# Patient Record
Sex: MALE | Race: OTHER
[De-identification: names, ages, dates, MRNs, and addresses within clinical notes are randomized per-mention and may not be internally consistent; named-entity substitution may affect disease eponyms.]

---

## 2017-01-03 ENCOUNTER — HOSPITAL ENCOUNTER (OUTPATIENT)
Dept: HOSPITAL 53 - M RAD | Age: 37
End: 2017-01-03
Attending: UROLOGY
Payer: COMMERCIAL

## 2017-01-03 DIAGNOSIS — Z90.79: Primary | ICD-10-CM

## 2017-01-03 DIAGNOSIS — K42.9: ICD-10-CM

## 2017-01-03 LAB
ANION GAP SERPL CALC-SCNC: 8 MEQ/L (ref 8–16)
BUN SERPL-MCNC: 15 MG/DL (ref 7–18)
CALCIUM SERPL-MCNC: 9 MG/DL (ref 8.5–10.1)
CHLORIDE SERPL-SCNC: 107 MEQ/L (ref 98–107)
CO2 SERPL-SCNC: 28 MEQ/L (ref 21–32)
CREAT SERPL-MCNC: 1 MG/DL (ref 0.7–1.3)
ERYTHROCYTE [DISTWIDTH] IN BLOOD BY AUTOMATED COUNT: 13.4 % (ref 11.5–14.5)
GFR SERPL CREATININE-BSD FRML MDRD: > 60 ML/MIN/{1.73_M2} (ref 60–?)
GLUCOSE SERPL-MCNC: 80 MG/DL (ref 70–105)
MCH RBC QN AUTO: 28.4 PG (ref 27–33)
MCHC RBC AUTO-ENTMCNC: 33.3 G/DL (ref 32–36.5)
MCV RBC AUTO: 85.2 FL (ref 80–96)
PLATELET # BLD AUTO: 261 K/MM3 (ref 150–450)
POTASSIUM SERPL-SCNC: 4.2 MEQ/L (ref 3.5–5.1)
SODIUM SERPL-SCNC: 143 MEQ/L (ref 136–145)
WBC # BLD AUTO: 5.2 K/MM3 (ref 4–10)

## 2017-01-04 NOTE — REP
Clinical:  Testicular cancer with prior orchiectomy for follow up.

 

Technique:  Axial contrast enhanced images from the lung bases to the pubic

symphysis using 100 ml Isovue 370 intravenous contrast material with precontrast

and delayed images of the abdomen as well as coronal and sagittal re-formations.

 

Comparison:  05/16/2016.

 

Findings:

Lung bases are clear.  Visualized heart and pericardium normal.

 

Liver, spleen, pancreas, gallbladder, bilateral adrenal glands and kidneys are

normal.  The enteric system is without obstruction or acute inflammatory process

and a normal terminal ileum and appendix are identified in the right lower

quadrant.  Scattered sigmoid diverticula noted without acute diverticulitis.

Pelvis demonstrates normal bladder and age appropriate prostate/seminal vesicles.

2 cm fat containing periumbilical hernia identified.  Evidence for prior

orchiectomy. No ascites.  No adenopathy.  No free air.  Evidence for partial

laminectomy at L5.

 

Impression:

1.  No evidence for malignancy or metastatic disease related to testicular

cancer.

2.  2 cm fat containing periumbilical hernia.

3.  No ascites, adenopathy, mass.

4.  Prior partial laminectomy and L5.

 

 

Signed by

David Adame MD 01/04/2017 12:29 A

## 2017-01-14 ENCOUNTER — HOSPITAL ENCOUNTER (OUTPATIENT)
Dept: HOSPITAL 53 - M LAB | Age: 37
End: 2017-01-14
Attending: PHYSICIAN ASSISTANT
Payer: COMMERCIAL

## 2017-01-14 DIAGNOSIS — I10: Primary | ICD-10-CM

## 2017-01-14 LAB
ALBUMIN SERPL BCG-MCNC: 3.7 GM/DL (ref 3.2–5.2)
ALBUMIN/GLOB SERPL: 1 {RATIO} (ref 1–1.93)
ALP SERPL-CCNC: 69 U/L (ref 45–117)
ALT SERPL W P-5'-P-CCNC: 59 U/L (ref 12–78)
ANION GAP SERPL CALC-SCNC: 8 MEQ/L (ref 8–16)
AST SERPL-CCNC: 24 U/L (ref 15–37)
BASOPHILS # BLD AUTO: 0 K/MM3 (ref 0–0.2)
BASOPHILS NFR BLD AUTO: 0.6 % (ref 0–1)
BILIRUB SERPL-MCNC: 0.5 MG/DL (ref 0.2–1)
BUN SERPL-MCNC: 14 MG/DL (ref 7–18)
CALCIUM SERPL-MCNC: 8.6 MG/DL (ref 8.5–10.1)
CHLORIDE SERPL-SCNC: 107 MEQ/L (ref 98–107)
CHOLEST SERPL-MCNC: 183 MG/DL (ref ?–200)
CO2 SERPL-SCNC: 29 MEQ/L (ref 21–32)
CREAT SERPL-MCNC: 1.07 MG/DL (ref 0.7–1.3)
EOSINOPHIL # BLD AUTO: 0.1 K/MM3 (ref 0–0.5)
EOSINOPHIL NFR BLD AUTO: 3.1 % (ref 0–3)
ERYTHROCYTE [DISTWIDTH] IN BLOOD BY AUTOMATED COUNT: 13.5 % (ref 11.5–14.5)
GFR SERPL CREATININE-BSD FRML MDRD: > 60 ML/MIN/{1.73_M2} (ref 60–?)
GLUCOSE SERPL-MCNC: 95 MG/DL (ref 70–105)
LARGE UNSTAINED CELL #: 0.2 K/MM3 (ref 0–0.4)
LARGE UNSTAINED CELL %: 3.8 % (ref 0–4)
LYMPHOCYTES # BLD AUTO: 1.1 K/MM3 (ref 1.5–4.5)
LYMPHOCYTES NFR BLD AUTO: 23 % (ref 24–44)
MCH RBC QN AUTO: 28.9 PG (ref 27–33)
MCHC RBC AUTO-ENTMCNC: 34.1 G/DL (ref 32–36.5)
MCV RBC AUTO: 84.9 FL (ref 80–96)
MONOCYTES # BLD AUTO: 0.3 K/MM3 (ref 0–0.8)
MONOCYTES NFR BLD AUTO: 6.8 % (ref 0–5)
NEUTROPHILS # BLD AUTO: 2.9 K/MM3 (ref 1.8–7.7)
NEUTROPHILS NFR BLD AUTO: 62.8 % (ref 36–66)
PLATELET # BLD AUTO: 253 K/MM3 (ref 150–450)
POTASSIUM SERPL-SCNC: 4.1 MEQ/L (ref 3.5–5.1)
PROT SERPL-MCNC: 7.4 GM/DL (ref 6.4–8.2)
SODIUM SERPL-SCNC: 144 MEQ/L (ref 136–145)
TRIGL SERPL-MCNC: 121 MG/DL (ref ?–150)
WBC # BLD AUTO: 4.6 K/MM3 (ref 4–10)

## 2017-03-30 ENCOUNTER — HOSPITAL ENCOUNTER (OUTPATIENT)
Dept: HOSPITAL 53 - M PT | Age: 37
LOS: 1 days | End: 2017-03-31
Payer: COMMERCIAL

## 2017-03-30 DIAGNOSIS — M51.17: ICD-10-CM

## 2017-03-30 DIAGNOSIS — Z51.89: Primary | ICD-10-CM

## 2017-04-12 ENCOUNTER — HOSPITAL ENCOUNTER (OUTPATIENT)
Dept: HOSPITAL 53 - M PT | Age: 37
LOS: 18 days | End: 2017-04-30
Payer: COMMERCIAL

## 2017-04-12 DIAGNOSIS — Z51.89: Primary | ICD-10-CM

## 2017-04-12 DIAGNOSIS — M51.17: ICD-10-CM

## 2017-06-08 ENCOUNTER — HOSPITAL ENCOUNTER (OUTPATIENT)
Dept: HOSPITAL 53 - M LAB | Age: 37
End: 2017-06-08
Attending: PHYSICIAN ASSISTANT
Payer: COMMERCIAL

## 2017-06-08 DIAGNOSIS — I10: ICD-10-CM

## 2017-06-08 DIAGNOSIS — E78.2: Primary | ICD-10-CM

## 2017-06-08 LAB
ALBUMIN SERPL BCG-MCNC: 3.6 GM/DL (ref 3.2–5.2)
ALBUMIN/GLOB SERPL: 0.88 {RATIO} (ref 1–1.93)
ALP SERPL-CCNC: 73 U/L (ref 45–117)
ALT SERPL W P-5'-P-CCNC: 62 U/L (ref 12–78)
ANION GAP SERPL CALC-SCNC: 7 MEQ/L (ref 8–16)
AST SERPL-CCNC: 26 U/L (ref 15–37)
BASOPHILS # BLD AUTO: 0 K/MM3 (ref 0–0.2)
BASOPHILS NFR BLD AUTO: 0.9 % (ref 0–1)
BILIRUB SERPL-MCNC: 0.4 MG/DL (ref 0.2–1)
BUN SERPL-MCNC: 16 MG/DL (ref 7–18)
CALCIUM SERPL-MCNC: 8.4 MG/DL (ref 8.5–10.1)
CHLORIDE SERPL-SCNC: 106 MEQ/L (ref 98–107)
CHOLEST SERPL-MCNC: 199 MG/DL (ref ?–200)
CO2 SERPL-SCNC: 29 MEQ/L (ref 21–32)
CREAT SERPL-MCNC: 1.11 MG/DL (ref 0.7–1.3)
EOSINOPHIL # BLD AUTO: 0.2 K/MM3 (ref 0–0.5)
EOSINOPHIL NFR BLD AUTO: 3.1 % (ref 0–3)
ERYTHROCYTE [DISTWIDTH] IN BLOOD BY AUTOMATED COUNT: 13.6 % (ref 11.5–14.5)
GFR SERPL CREATININE-BSD FRML MDRD: > 60 ML/MIN/{1.73_M2} (ref 60–?)
GLUCOSE SERPL-MCNC: 90 MG/DL (ref 70–105)
LYMPHOCYTES # BLD AUTO: 1.5 K/MM3 (ref 1.5–4.5)
LYMPHOCYTES NFR BLD AUTO: 25.2 % (ref 24–44)
MCH RBC QN AUTO: 28.9 PG (ref 27–33)
MCHC RBC AUTO-ENTMCNC: 33.2 G/DL (ref 32–36.5)
MCV RBC AUTO: 87 FL (ref 80–96)
MONOCYTES # BLD AUTO: 0.4 K/MM3 (ref 0–0.8)
MONOCYTES NFR BLD AUTO: 6.5 % (ref 0–5)
NEUTROPHILS # BLD AUTO: 3.3 K/MM3 (ref 1.8–7.7)
NEUTROPHILS NFR BLD AUTO: 60.8 % (ref 36–66)
PLATELET # BLD AUTO: 263 K/MM3 (ref 150–450)
POTASSIUM SERPL-SCNC: 3.9 MEQ/L (ref 3.5–5.1)
PROT SERPL-MCNC: 7.7 GM/DL (ref 6.4–8.2)
SODIUM SERPL-SCNC: 142 MEQ/L (ref 136–145)
TRIGL SERPL-MCNC: 110 MG/DL (ref ?–150)
WBC # BLD AUTO: 5.4 K/MM3 (ref 4–10)

## 2017-06-29 ENCOUNTER — HOSPITAL ENCOUNTER (OUTPATIENT)
Dept: HOSPITAL 53 - M WUC | Age: 37
End: 2017-06-29
Attending: UROLOGY
Payer: COMMERCIAL

## 2017-06-29 DIAGNOSIS — Z85.47: Primary | ICD-10-CM

## 2017-06-29 LAB
ALBUMIN SERPL BCG-MCNC: 3.8 GM/DL (ref 3.2–5.2)
ALBUMIN/GLOB SERPL: 1 {RATIO} (ref 1–1.93)
ALP SERPL-CCNC: 64 U/L (ref 45–117)
ALT SERPL W P-5'-P-CCNC: 59 U/L (ref 12–78)
ANION GAP SERPL CALC-SCNC: 7 MEQ/L (ref 8–16)
AST SERPL-CCNC: 26 U/L (ref 15–37)
BILIRUB SERPL-MCNC: 0.5 MG/DL (ref 0.2–1)
BUN SERPL-MCNC: 14 MG/DL (ref 7–18)
CALCIUM SERPL-MCNC: 8.8 MG/DL (ref 8.5–10.1)
CHLORIDE SERPL-SCNC: 106 MEQ/L (ref 98–107)
CO2 SERPL-SCNC: 27 MEQ/L (ref 21–32)
CREAT SERPL-MCNC: 0.98 MG/DL (ref 0.7–1.3)
ERYTHROCYTE [DISTWIDTH] IN BLOOD BY AUTOMATED COUNT: 13.7 % (ref 11.5–14.5)
GFR SERPL CREATININE-BSD FRML MDRD: > 60 ML/MIN/{1.73_M2} (ref 60–?)
GLUCOSE SERPL-MCNC: 91 MG/DL (ref 70–105)
MCH RBC QN AUTO: 29.7 PG (ref 27–33)
MCHC RBC AUTO-ENTMCNC: 34.4 G/DL (ref 32–36.5)
MCV RBC AUTO: 86.2 FL (ref 80–96)
PLATELET # BLD AUTO: 244 K/MM3 (ref 150–450)
POTASSIUM SERPL-SCNC: 4.4 MEQ/L (ref 3.5–5.1)
PROT SERPL-MCNC: 7.6 GM/DL (ref 6.4–8.2)
SODIUM SERPL-SCNC: 140 MEQ/L (ref 136–145)
WBC # BLD AUTO: 4.6 K/MM3 (ref 4–10)

## 2017-07-02 LAB
HCG INTACT+B SERPL-ACNC: < 1 MIU/ML (ref 0–3)
LDH SERPL L TO P-CCNC: 178 IU/L (ref 121–224)
LDH1 CFR SERPL ELPH: 20 % (ref 17–32)
LDH2 CFR SERPL ELPH: 36 % (ref 25–40)
LDH3 CFR SERPL ELPH: 22 % (ref 17–27)
LDH4 CFR SERPL ELPH: 7 % (ref 5–13)
LDH5 CFR SERPL ELPH: 15 % (ref 4–20)

## 2017-07-06 ENCOUNTER — HOSPITAL ENCOUNTER (OUTPATIENT)
Dept: HOSPITAL 53 - M RAD | Age: 37
End: 2017-07-06
Attending: UROLOGY
Payer: COMMERCIAL

## 2017-07-06 DIAGNOSIS — Z90.79: Primary | ICD-10-CM

## 2017-07-06 NOTE — REP
Chest two views

 

HISTORY: Orchiectomy

 

Comparison: 02/01/2016

 

The lungs are clear.  The heart is normal in size.  The pulmonary vasculature is

normal in appearance.  The bony structure is intact.

 

IMPRESSION:  No acute disease.

 

 

Signed by

Christian Cassidy MD 07/06/2017 09:27 A

## 2017-10-05 ENCOUNTER — HOSPITAL ENCOUNTER (OUTPATIENT)
Dept: HOSPITAL 53 - M WUC | Age: 37
End: 2017-10-05
Attending: PHYSICIAN ASSISTANT
Payer: COMMERCIAL

## 2017-10-05 DIAGNOSIS — E78.2: Primary | ICD-10-CM

## 2017-10-05 LAB
ALBUMIN SERPL BCG-MCNC: 3.8 GM/DL (ref 3.2–5.2)
ALBUMIN/GLOB SERPL: 0.97 {RATIO} (ref 1–1.93)
ALP SERPL-CCNC: 67 U/L (ref 45–117)
ALT SERPL W P-5'-P-CCNC: 48 U/L (ref 12–78)
ANION GAP SERPL CALC-SCNC: 5 MEQ/L (ref 8–16)
AST SERPL-CCNC: 21 U/L (ref 15–37)
BILIRUB SERPL-MCNC: 0.5 MG/DL (ref 0.2–1)
BUN SERPL-MCNC: 15 MG/DL (ref 7–18)
CALCIUM SERPL-MCNC: 8.8 MG/DL (ref 8.5–10.1)
CHLORIDE SERPL-SCNC: 107 MEQ/L (ref 98–107)
CHOLEST SERPL-MCNC: 194 MG/DL (ref ?–200)
CO2 SERPL-SCNC: 29 MEQ/L (ref 21–32)
CREAT SERPL-MCNC: 1.06 MG/DL (ref 0.7–1.3)
GFR SERPL CREATININE-BSD FRML MDRD: > 60 ML/MIN/{1.73_M2} (ref 60–?)
GLUCOSE SERPL-MCNC: 89 MG/DL (ref 70–105)
POTASSIUM SERPL-SCNC: 4.1 MEQ/L (ref 3.5–5.1)
PROT SERPL-MCNC: 7.7 GM/DL (ref 6.4–8.2)
SODIUM SERPL-SCNC: 141 MEQ/L (ref 136–145)
TRIGL SERPL-MCNC: 97 MG/DL (ref ?–150)

## 2018-01-10 ENCOUNTER — HOSPITAL ENCOUNTER (OUTPATIENT)
Dept: HOSPITAL 53 - M WUC | Age: 38
End: 2018-01-10
Attending: PHYSICIAN ASSISTANT
Payer: COMMERCIAL

## 2018-01-10 ENCOUNTER — HOSPITAL ENCOUNTER (OUTPATIENT)
Dept: HOSPITAL 53 - M WUC | Age: 38
End: 2018-01-10
Attending: UROLOGY
Payer: COMMERCIAL

## 2018-01-10 DIAGNOSIS — Z85.47: Primary | ICD-10-CM

## 2018-01-10 DIAGNOSIS — E78.2: Primary | ICD-10-CM

## 2018-01-10 LAB
ALBUMIN/GLOBULIN RATIO: 1 (ref 1–1.93)
ALBUMIN: 4 GM/DL (ref 3.2–5.2)
ALKALINE PHOSPHATASE: 63 U/L (ref 45–117)
ALT SERPL W P-5'-P-CCNC: 51 U/L (ref 12–78)
ANION GAP: 7 MEQ/L (ref 8–16)
ANION GAP: 7 MEQ/L (ref 8–16)
AST SERPL-CCNC: 26 U/L (ref 7–37)
BASO #: 0 10^3/UL (ref 0–0.2)
BASO %: 0.8 % (ref 0–1)
BILIRUBIN,TOTAL: 0.5 MG/DL (ref 0.2–1)
BLOOD UREA NITROGEN: 13 MG/DL (ref 7–18)
BLOOD UREA NITROGEN: 14 MG/DL (ref 7–18)
CALCIUM LEVEL: 8.9 MG/DL (ref 8.5–10.1)
CALCIUM LEVEL: 9.1 MG/DL (ref 8.5–10.1)
CARBON DIOXIDE LEVEL: 28 MEQ/L (ref 21–32)
CARBON DIOXIDE LEVEL: 30 MEQ/L (ref 21–32)
CHLORIDE LEVEL: 105 MEQ/L (ref 98–107)
CHLORIDE LEVEL: 106 MEQ/L (ref 98–107)
CHOLEST SERPL-MCNC: 209 MG/DL (ref ?–200)
CHOLESTEROL RISK RATIO: 4.64 (ref ?–5)
CREATININE FOR GFR: 1.06 MG/DL (ref 0.7–1.3)
CREATININE FOR GFR: 1.09 MG/DL (ref 0.7–1.3)
EOS #: 0.1 10^3/UL (ref 0–0.5)
EOSINOPHIL NFR BLD AUTO: 2.8 % (ref 0–3)
GFR SERPL CREATININE-BSD FRML MDRD: > 60 ML/MIN/{1.73_M2} (ref 60–?)
GFR SERPL CREATININE-BSD FRML MDRD: > 60 ML/MIN/{1.73_M2} (ref 60–?)
GLUCOSE, FASTING: 86 MG/DL (ref 70–105)
GLUCOSE, FASTING: 89 MG/DL (ref 70–105)
HDLC SERPL-MCNC: 45 MG/DL (ref 40–?)
HEMATOCRIT: 47.9 % (ref 42–52)
HEMATOCRIT: 47.9 % (ref 42–52)
HEMOGLOBIN: 15.6 G/DL (ref 14–18)
HEMOGLOBIN: 15.8 G/DL (ref 14–18)
IMMATURE GRANULOCYTE #: 0 10^3/UL (ref 0–0)
IMMATURE GRANULOCYTE %: 0.2 % (ref 0–0)
LDL CHOLESTEROL: 138 MG/DL (ref ?–100)
LYMPH #: 1.3 10^3/UL (ref 1.5–4.5)
LYMPH %: 26.4 % (ref 24–44)
MEAN CORPUSCULAR HEMOGLOBIN: 27.9 PG (ref 27–33)
MEAN CORPUSCULAR HEMOGLOBIN: 28.5 PG (ref 27–33)
MEAN CORPUSCULAR HGB CONC: 32.6 G/DL (ref 32–36.5)
MEAN CORPUSCULAR HGB CONC: 33 G/DL (ref 32–36.5)
MEAN CORPUSCULAR VOLUME: 85.5 FL (ref 80–96)
MEAN CORPUSCULAR VOLUME: 86.5 FL (ref 80–96)
MONO #: 0.5 10^3/UL (ref 0–0.8)
MONO %: 10.2 % (ref 0–5)
NEUTROPHILS #: 2.9 10^3/UL (ref 1.8–7.7)
NEUTROPHILS %: 59.6 % (ref 36–66)
NONHDLC SERPL-MCNC: 164 MG/DL
NRBC BLD AUTO-RTO: 0 % (ref 0–0)
NRBC BLD AUTO-RTO: 0 % (ref 0–0)
PLATELET COUNT, AUTOMATED: 280 10^3/UL (ref 150–450)
PLATELET COUNT, AUTOMATED: 303 10^3/UL (ref 150–450)
POTASSIUM SERUM: 4.2 MEQ/L (ref 3.5–5.1)
POTASSIUM SERUM: 4.3 MEQ/L (ref 3.5–5.1)
RED BLOOD COUNT: 5.54 10^6/UL (ref 4.3–6.1)
RED BLOOD COUNT: 5.6 10^6/UL (ref 4.3–6.1)
RED CELL DISTRIBUTION WIDTH: 13.3 % (ref 11.5–14.5)
RED CELL DISTRIBUTION WIDTH: 13.3 % (ref 11.5–14.5)
SODIUM LEVEL: 141 MEQ/L (ref 136–145)
SODIUM LEVEL: 142 MEQ/L (ref 136–145)
TOTAL PROTEIN: 8 GM/DL (ref 6.4–8.2)
TRIGLYCERIDES LEVEL: 130 MG/DL (ref ?–150)
WHITE BLOOD COUNT: 4.9 10^3/UL (ref 4–10)
WHITE BLOOD COUNT: 5 10^3/UL (ref 4–10)

## 2018-01-10 PROCEDURE — 83625 ASSAY OF LDH ENZYMES: CPT

## 2018-01-10 PROCEDURE — 80053 COMPREHEN METABOLIC PANEL: CPT

## 2018-01-12 LAB — ALPHA FETOPROTEIN TUMOR QUANT: 6.8 NG/ML (ref ?–8.1)

## 2018-01-15 LAB
(LD) FRACTION 1: 23 % (ref 17–32)
(LD) FRACTION 3: 23 % (ref 17–27)
(LD) FRACTION 5: 12 % (ref 4–20)
HCG INTACT+B SERPL-ACNC: < 1 MIU/ML (ref 0–3)
LDH SERPL L TO P-CCNC: 147 IU/L (ref 121–224)
LDH2 CFR SERPL ELPH: 33 % (ref 25–40)
LDH4 CFR SERPL ELPH: 9 % (ref 5–13)

## 2018-01-18 ENCOUNTER — HOSPITAL ENCOUNTER (OUTPATIENT)
Dept: HOSPITAL 53 - M RAD | Age: 38
End: 2018-01-18
Attending: UROLOGY
Payer: COMMERCIAL

## 2018-01-18 DIAGNOSIS — K75.81: Primary | ICD-10-CM

## 2018-01-18 DIAGNOSIS — Z85.47: ICD-10-CM

## 2018-03-04 ENCOUNTER — HOSPITAL ENCOUNTER (OUTPATIENT)
Dept: HOSPITAL 53 - M LAB | Age: 38
End: 2018-03-04
Attending: FAMILY MEDICINE
Payer: COMMERCIAL

## 2018-03-04 DIAGNOSIS — E03.9: ICD-10-CM

## 2018-03-04 DIAGNOSIS — R53.83: ICD-10-CM

## 2018-03-04 DIAGNOSIS — I10: Primary | ICD-10-CM

## 2018-03-04 LAB
ALBUMIN/GLOBULIN RATIO: 0.95 (ref 1–1.93)
ALBUMIN: 3.7 GM/DL (ref 3.2–5.2)
ALKALINE PHOSPHATASE: 57 U/L (ref 45–117)
ALT SERPL W P-5'-P-CCNC: 34 U/L (ref 12–78)
ANION GAP: 8 MEQ/L (ref 8–16)
AST SERPL-CCNC: 14 U/L (ref 7–37)
BILIRUBIN,TOTAL: 1 MG/DL (ref 0.2–1)
BLOOD UREA NITROGEN: 12 MG/DL (ref 7–18)
CALCIUM LEVEL: 8.6 MG/DL (ref 8.5–10.1)
CARBON DIOXIDE LEVEL: 28 MEQ/L (ref 21–32)
CHLORIDE LEVEL: 106 MEQ/L (ref 98–107)
CHOLEST SERPL-MCNC: 192 MG/DL (ref ?–200)
CHOLESTEROL RISK RATIO: 4.08 (ref ?–5)
CREATININE FOR GFR: 1.05 MG/DL (ref 0.7–1.3)
EST. AVERAGE GLUCOSE BLD GHB EST-MCNC: 114 MG/DL (ref 60–110)
GFR SERPL CREATININE-BSD FRML MDRD: > 60 ML/MIN/{1.73_M2} (ref 60–?)
GLUCOSE, FASTING: 89 MG/DL (ref 70–100)
HDLC SERPL-MCNC: 47 MG/DL (ref 40–?)
HEMATOCRIT: 45 % (ref 42–52)
HEMOGLOBIN: 15.2 G/DL (ref 14–18)
LDL CHOLESTEROL: 132 MG/DL (ref ?–100)
MEAN CORPUSCULAR HEMOGLOBIN: 28.6 PG (ref 27–33)
MEAN CORPUSCULAR HGB CONC: 33.8 G/DL (ref 32–36.5)
MEAN CORPUSCULAR VOLUME: 84.6 FL (ref 80–96)
NONHDLC SERPL-MCNC: 145 MG/DL
NRBC BLD AUTO-RTO: 0 % (ref 0–0)
PLATELET COUNT, AUTOMATED: 292 10^3/UL (ref 150–450)
POTASSIUM SERUM: 3.9 MEQ/L (ref 3.5–5.1)
PROSTATIC SPECIFIC AG MONITOR: 0.35 NG/ML (ref ?–4)
RED BLOOD COUNT: 5.32 10^6/UL (ref 4.3–6.1)
RED CELL DISTRIBUTION WIDTH: 13.5 % (ref 11.5–14.5)
SODIUM LEVEL: 142 MEQ/L (ref 136–145)
THYROID STIMULATING HORMONE: 1.64 UIU/ML (ref 0.36–3.74)
TOTAL PROTEIN: 7.6 GM/DL (ref 6.4–8.2)
TRIGLYCERIDES LEVEL: 65 MG/DL (ref ?–150)
WHITE BLOOD COUNT: 11.1 10^3/UL (ref 4–10)

## 2018-03-04 PROCEDURE — 71046 X-RAY EXAM CHEST 2 VIEWS: CPT

## 2018-07-09 ENCOUNTER — HOSPITAL ENCOUNTER (OUTPATIENT)
Dept: HOSPITAL 53 - M RAD | Age: 38
End: 2018-07-09
Attending: UROLOGY
Payer: COMMERCIAL

## 2018-07-09 DIAGNOSIS — Z85.47: Primary | ICD-10-CM

## 2018-07-09 PROCEDURE — 71046 X-RAY EXAM CHEST 2 VIEWS: CPT

## 2018-07-19 ENCOUNTER — HOSPITAL ENCOUNTER (OUTPATIENT)
Dept: HOSPITAL 53 - M WUC | Age: 38
End: 2018-07-19
Attending: UROLOGY
Payer: COMMERCIAL

## 2018-07-19 DIAGNOSIS — Z85.47: Primary | ICD-10-CM

## 2018-07-19 LAB — LDH SERPL L TO P-CCNC: 170 U/L (ref 87–241)

## 2018-07-19 PROCEDURE — 83615 LACTATE (LD) (LDH) ENZYME: CPT

## 2018-07-20 LAB
ALPHA FETOPROTEIN TUMOR QUANT: 3.9 NG/ML (ref ?–8.1)
HCG INTACT+B SERPL-ACNC: < 1 MIU/ML (ref 0–3)

## 2018-07-31 ENCOUNTER — HOSPITAL ENCOUNTER (OUTPATIENT)
Dept: HOSPITAL 53 - M RAD | Age: 38
End: 2018-07-31
Attending: UROLOGY
Payer: COMMERCIAL

## 2018-07-31 DIAGNOSIS — Z90.79: ICD-10-CM

## 2018-07-31 DIAGNOSIS — K57.30: ICD-10-CM

## 2018-07-31 DIAGNOSIS — Z85.47: Primary | ICD-10-CM

## 2018-07-31 LAB
ANION GAP: 7 MEQ/L (ref 8–16)
BLOOD UREA NITROGEN: 15 MG/DL (ref 7–18)
CALCIUM LEVEL: 8.6 MG/DL (ref 8.5–10.1)
CARBON DIOXIDE LEVEL: 29 MEQ/L (ref 21–32)
CHLORIDE LEVEL: 108 MEQ/L (ref 98–107)
CREATININE FOR GFR: 1.18 MG/DL (ref 0.7–1.3)
GFR SERPL CREATININE-BSD FRML MDRD: > 60 ML/MIN/{1.73_M2} (ref 60–?)
GLUCOSE, FASTING: 100 MG/DL (ref 70–100)
POTASSIUM SERUM: 3.8 MEQ/L (ref 3.5–5.1)
SODIUM LEVEL: 144 MEQ/L (ref 136–145)

## 2019-01-28 ENCOUNTER — HOSPITAL ENCOUNTER (OUTPATIENT)
Dept: HOSPITAL 53 - M WUC | Age: 39
End: 2019-01-28
Attending: UROLOGY
Payer: COMMERCIAL

## 2019-01-28 DIAGNOSIS — Z85.47: Primary | ICD-10-CM

## 2019-01-28 LAB — LDH SERPL L TO P-CCNC: 171 U/L (ref 87–241)

## 2019-02-07 ENCOUNTER — HOSPITAL ENCOUNTER (OUTPATIENT)
Dept: HOSPITAL 53 - M RAD | Age: 39
End: 2019-02-07
Attending: UROLOGY
Payer: COMMERCIAL

## 2019-02-07 DIAGNOSIS — C62.92: Primary | ICD-10-CM

## 2019-02-07 PROCEDURE — 74177 CT ABD & PELVIS W/CONTRAST: CPT

## 2019-02-08 NOTE — REP
Clinical:  Testicular cancer for restaging.

 

Technique:  Axial contrast enhanced images from the lung bases to the pubic

symphysis using 100 ml Isovue 370 intravenous contrast material with delayed

images of the abdomen as well as coronal and sagittal re-formations.

 

Comparison:  07/31/2018.

 

Findings:

Lung bases are clear.  Visualized heart and pericardium normal.

 

Liver, spleen, pancreas, gallbladder, bilateral adrenal glands and kidneys are

normal.  The enteric system is without obstruction or acute inflammatory process.

Colonic and sigmoid diverticula noted without acute diverticulitis.  Normal

terminal ileum and appendix are identified in the right lower quadrant.  1 cm fat

containing periumbilical hernia identified.

 

Pelvis demonstrates collapsed normal bladder and age appropriate prostate/seminal

vesicles.  Postsurgical changes in the left groin consistent with prior

orchiectomy.  No pelvic fluid or ascites.  No significant intraperitoneal,

retroperitoneal or pelvic/inguinal adenopathy.  No free air.  Abdominal aorta and

vasculature without aneurysm or dissection.  Musculoskeletal structures intact

without focal osseous abnormality.

 

Impression:

1.  Postsurgical changes in the left groin consistent with prior left

orchiectomy.

2.  Diverticulosis without acute diverticulitis.

3.  No further acute abdominopelvic pathology appreciated.

4.  Specifically, no ascites, adenopathy, mass lesion or evidence for metastatic

disease/recurrence.

 

 

Electronically Signed by

David Adame MD 02/08/2019 09:02 A

## 2019-06-04 ENCOUNTER — HOSPITAL ENCOUNTER (OUTPATIENT)
Dept: HOSPITAL 53 - M OPP | Age: 39
Discharge: HOME | End: 2019-06-04
Attending: INTERNAL MEDICINE
Payer: COMMERCIAL

## 2019-06-04 VITALS — SYSTOLIC BLOOD PRESSURE: 153 MMHG | DIASTOLIC BLOOD PRESSURE: 77 MMHG

## 2019-06-04 VITALS — WEIGHT: 315 LBS | HEIGHT: 75 IN | BODY MASS INDEX: 39.17 KG/M2

## 2019-06-04 DIAGNOSIS — K64.8: ICD-10-CM

## 2019-06-04 DIAGNOSIS — K57.30: Primary | ICD-10-CM

## 2019-06-04 NOTE — ROOR
________________________________________________________________________________

Patient Name: Lucius Ham               Procedure Date: 6/4/2019 7:30 AM

MRN: [id number]                          Account Number: [id number]

YOB: 1980               Age: 38

Room: [facility]                            Gender: Male

Note Status: Finalized                 

________________________________________________________________________________

 

Procedure:           Colonoscopy

Indications:         Follow-up of diverticulitis

Providers:           Blu Peng MD

Referring MD:        JAYLA MIR MD

Requesting Provider: 

Medicines:           Monitored Anesthesia Care

Complications:       No immediate complications.

________________________________________________________________________________

Procedure:           Pre-Anesthesia Assessment:

                     - Prior to the procedure, a History and Physical was 

                     performed, and patient medications and allergies were 

                     reviewed. The patient is competent. The risks and 

                     benefits of the procedure and the sedation options and 

                     risks were discussed with the patient. All questions were 

                     answered and informed consent was obtained. Patient 

                     identification and proposed procedure were verified by 

                     the physician, the nurse and the anesthesiologist in the 

                     procedure room. Mental Status Examination: alert and 

                     oriented. Airway Examination: normal oropharyngeal airway 

                     and neck mobility. Respiratory Examination: clear to 

                     auscultation. CV Examination: normal. Prophylactic 

                     Antibiotics: The patient does not require prophylactic 

                     antibiotics. Prior Anticoagulants: The patient has taken 

                     no previous anticoagulant or antiplatelet agents. ASA 

                     Grade Assessment: III - A patient with severe systemic 

                     disease. After reviewing the risks and benefits, the 

                     patient was deemed in satisfactory condition to undergo 

                     the procedure. The anesthesia plan was to use monitored 

                     anesthesia care (MAC). Immediately prior to 

                     administration of medications, the patient was 

                     re-assessed for adequacy to receive sedatives. The heart 

                     rate, respiratory rate, oxygen saturations, blood 

                     pressure, adequacy of pulmonary ventilation, and response 

                     to care were monitored throughout the procedure. The 

                     physical status of the patient was re-assessed after the 

                     procedure.

                     The Colonoscope was introduced through the anus and 

                     advanced to the cecum, identified by appendiceal orifice 

                     and ileocecal valve. The colonoscopy was performed 

                     without difficulty. The patient tolerated the procedure 

                     well. The quality of the bowel preparation was adequate 

                     to identify polyps 6 mm and larger in size and fair. The 

                     terminal ileum, ileocecal valve, appendiceal orifice, and 

                     rectum were photographed. Scope insertion time was 2 

                     minutes. Scope withdrawal time was 9 minutes. The total 

                     duration of the procedure was 11 minutes.

                                                                                

Findings:

     The perianal and digital rectal examinations were normal.

     Multiple small and large-mouthed diverticula were found from sigmoid to 

     ascending colon. There was no evidence of diverticular bleeding.

     Non-bleeding external and internal hemorrhoids were found during 

     retroflexion. The hemorrhoids were medium-sized.

     The exam was otherwise normal throughout the examined colon.

                                                                                

Impression:          - Preparation of the colon was fair.

                     - Moderate diverticulosis from sigmoid to ascending 

                     colon. There was no evidence of diverticular bleeding.

                     - Non-bleeding external and internal hemorrhoids.

                     - No specimens collected.

Recommendation:      - Patient has a contact number available for emergencies. 

                     The signs and symptoms of potential delayed complications 

                     were discussed with the patient. Return to normal 

                     activities tomorrow. Written discharge instructions were 

                     provided to the patient.

                     - High fiber diet.

                     - Continue present medications.

                     - Colace capsule(s) orally 100 mg BID.

                     - Repeat colonoscopy at age 49 for screening purposes.

                     - Telephone GI clinic if symptomatic.

                     - Return to primary care physician.

                                                                                

 

Blu ePng MD

_______________________

Blu Peng MD

6/4/2019 8:05:17 AM

Electronically signed by Blu Peng MD

Number of Addenda: 0

 

Note Initiated On: 6/4/2019 7:30 AM

Estimated Blood Loss:

     Estimated blood loss: none.

## 2020-02-11 ENCOUNTER — HOSPITAL ENCOUNTER (OUTPATIENT)
Dept: HOSPITAL 53 - M RAD | Age: 40
End: 2020-02-11
Attending: UROLOGY
Payer: COMMERCIAL

## 2020-02-11 DIAGNOSIS — Z85.47: Primary | ICD-10-CM

## 2020-02-11 LAB
BUN SERPL-MCNC: 15 MG/DL (ref 7–18)
CALCIUM SERPL-MCNC: 9.5 MG/DL (ref 8.5–10.1)
CHLORIDE SERPL-SCNC: 103 MEQ/L (ref 98–107)
CO2 SERPL-SCNC: 28 MEQ/L (ref 21–32)
CREAT SERPL-MCNC: 1.16 MG/DL (ref 0.7–1.3)
GFR SERPL CREATININE-BSD FRML MDRD: > 60 ML/MIN/{1.73_M2} (ref 60–?)
GLUCOSE SERPL-MCNC: 99 MG/DL (ref 70–100)
LDH SERPL L TO P-CCNC: 165 U/L (ref 87–241)
POTASSIUM SERPL-SCNC: 3.7 MEQ/L (ref 3.5–5.1)
SODIUM SERPL-SCNC: 138 MEQ/L (ref 136–145)

## 2020-02-11 PROCEDURE — 80048 BASIC METABOLIC PNL TOTAL CA: CPT

## 2020-02-11 PROCEDURE — 74177 CT ABD & PELVIS W/CONTRAST: CPT

## 2020-02-11 PROCEDURE — 36415 COLL VENOUS BLD VENIPUNCTURE: CPT

## 2020-02-11 PROCEDURE — 83615 LACTATE (LD) (LDH) ENZYME: CPT

## 2020-02-11 PROCEDURE — 82105 ALPHA-FETOPROTEIN SERUM: CPT

## 2020-02-11 PROCEDURE — 84702 CHORIONIC GONADOTROPIN TEST: CPT

## 2020-02-11 NOTE — REP
Clinical:  History of testicular carcinoma.

 

Technique:  Axial contrast enhanced images from the thoracic inlet to the upper

abdomen using 100 ml Isovue 370 intravenous contrast material with coronal and

sagittal re-formations as well as delayed images of the abdomen.

 

Comparison:  02/07/2019, 01/18/2018.

 

Findings:

Lung bases demonstrate minimal posterior basilar dependent changes.

 

Fatty infiltration to the liver noted without focal hepatic lesion.  Spleen,

pancreas, gallbladder, bilateral adrenal glands and kidneys are normal.  The

enteric system is without obstruction or acute inflammatory process.  Normal

terminal ileum and appendix are identified in the right lower quadrant.

Scattered sigmoid diverticula noted without acute diverticulitis.  Pelvis

demonstrates partially collapsed normal bladder and age appropriate

prostate/seminal vesicles.  Postsurgical scarring at the left groin noted.  No

ascites.  No free air.  No adenopathy.  Abdominal aorta and vasculature appear

normal.  Musculoskeletal structures are intact without focal abnormality.

 

Impression:

1.  Hepatic steatosis.

2.  No acute abdominopelvic pathology appreciated.  No evidence for recurrence or

metastatic disease.

 

 

Electronically Signed by

David Adame MD 02/11/2020 10:58 A

## 2020-08-18 ENCOUNTER — HOSPITAL ENCOUNTER (OUTPATIENT)
Dept: HOSPITAL 53 - M WUC | Age: 40
End: 2020-08-18
Attending: UROLOGY
Payer: COMMERCIAL

## 2020-08-18 DIAGNOSIS — Z85.47: Primary | ICD-10-CM

## 2020-10-12 LAB — LDH SERPL L TO P-CCNC: 187 U/L (ref 87–241)

## 2021-02-17 ENCOUNTER — HOSPITAL ENCOUNTER (OUTPATIENT)
Dept: HOSPITAL 53 - M WUC | Age: 41
End: 2021-02-17
Attending: UROLOGY
Payer: COMMERCIAL

## 2021-02-17 DIAGNOSIS — Z85.47: Primary | ICD-10-CM

## 2021-02-17 LAB
B-HCG SERPL-ACNC: < 1 MIU/ML
LDH SERPL L TO P-CCNC: 163 U/L (ref 87–241)

## 2021-04-16 ENCOUNTER — HOSPITAL ENCOUNTER (OUTPATIENT)
Dept: HOSPITAL 53 - M LAB | Age: 41
End: 2021-04-16
Attending: FAMILY MEDICINE
Payer: COMMERCIAL

## 2021-04-16 DIAGNOSIS — I10: Primary | ICD-10-CM

## 2021-04-16 LAB
ALBUMIN SERPL BCG-MCNC: 3.9 GM/DL (ref 3.2–5.2)
ALT SERPL W P-5'-P-CCNC: 48 U/L (ref 12–78)
BILIRUB SERPL-MCNC: 0.7 MG/DL (ref 0.2–1)
BUN SERPL-MCNC: 15 MG/DL (ref 7–18)
CALCIUM SERPL-MCNC: 9.4 MG/DL (ref 8.5–10.1)
CHLORIDE SERPL-SCNC: 105 MEQ/L (ref 98–107)
CHOLEST SERPL-MCNC: 208 MG/DL (ref ?–200)
CHOLEST/HDLC SERPL: 4.52 {RATIO} (ref ?–5)
CO2 SERPL-SCNC: 30 MEQ/L (ref 21–32)
CREAT SERPL-MCNC: 1.07 MG/DL (ref 0.7–1.3)
EST. AVERAGE GLUCOSE BLD GHB EST-MCNC: 111 MG/DL (ref 60–110)
GFR SERPL CREATININE-BSD FRML MDRD: > 60 ML/MIN/{1.73_M2} (ref 60–?)
GLUCOSE SERPL-MCNC: 93 MG/DL (ref 70–100)
HCT VFR BLD AUTO: 48.1 % (ref 42–52)
HDLC SERPL-MCNC: 46 MG/DL (ref 40–?)
HGB BLD-MCNC: 15.7 G/DL (ref 13.5–17.5)
LDLC SERPL CALC-MCNC: 144 MG/DL (ref ?–100)
MCH RBC QN AUTO: 28.2 PG (ref 27–33)
MCHC RBC AUTO-ENTMCNC: 32.6 G/DL (ref 32–36.5)
MCV RBC AUTO: 86.4 FL (ref 80–96)
NONHDLC SERPL-MCNC: 162 MG/DL
PLATELET # BLD AUTO: 300 10^3/UL (ref 150–450)
POTASSIUM SERPL-SCNC: 3.5 MEQ/L (ref 3.5–5.1)
PROT SERPL-MCNC: 7.9 GM/DL (ref 6.4–8.2)
PSA SERPL-MCNC: 0.51 NG/ML (ref ?–4)
RBC # BLD AUTO: 5.57 10^6/UL (ref 4.3–6.1)
SODIUM SERPL-SCNC: 140 MEQ/L (ref 136–145)
TESTOST SERPL-MCNC: 532 NG/DL (ref 241–827)
TRIGL SERPL-MCNC: 92 MG/DL (ref ?–150)
TSH SERPL DL<=0.005 MIU/L-ACNC: 2 UIU/ML (ref 0.36–3.74)
WBC # BLD AUTO: 4.9 10^3/UL (ref 4–10)

## 2021-04-16 NOTE — REP
INDICATION:

[facility] LABS AND EKG FIRST.



COMPARISON:

Comparison chest x-ray July 9, 2018.



TECHNIQUE:

Two views..



FINDINGS:

The lungs are well inflated and free of infiltrate.  The pleural angles are sharp.

The heart size is normal.  Pulmonary vasculature is not increased.  No significant

bony abnormality is seen.



IMPRESSION:

Negative chest x-ray.





<Electronically signed by Danny Amador > 04/16/21 [id number]

## 2021-04-17 NOTE — ECGEPIP
[facility]

                                       

                                       Test Date:    2021

Pat Name:     VISHAL RAMIREZ               Department:   

Patient ID:   [id number]                 Room:         -

Gender:       Male                     Technician:   ara

:          1980               Requested By: Bill Quigley

Order Number: [id number]     Reading MD:   Andrew Walls

                                 Measurements

Intervals                              Axis          

Rate:         83                       P:            26

NE:           160                      QRS:          23

QRSD:         88                       T:            19

QT:           384                                    

QTc:          451                                    

                           Interpretive Statements

*** Poor data quality, interpretation may be adversely affected

Normal sinus rhythm

RSR' IN V1 OR V2, PROBABLY NORMAL VARIANT

No remarkable changes. Compared to prior tracings (2) in the system

Electronically Signed on 2021 21:04:27 EDT by Andrew Walls

## 2021-08-31 ENCOUNTER — HOSPITAL ENCOUNTER (OUTPATIENT)
Dept: HOSPITAL 53 - M WUC | Age: 41
End: 2021-08-31
Attending: UROLOGY
Payer: COMMERCIAL

## 2021-08-31 DIAGNOSIS — Z85.47: Primary | ICD-10-CM

## 2021-08-31 LAB — LDH SERPL L TO P-CCNC: 153 U/L (ref 87–241)

## 2022-04-28 ENCOUNTER — HOSPITAL ENCOUNTER (OUTPATIENT)
Dept: HOSPITAL 53 - M RAD | Age: 42
End: 2022-04-28
Attending: FAMILY MEDICINE
Payer: COMMERCIAL

## 2022-04-28 DIAGNOSIS — I10: Primary | ICD-10-CM

## 2022-04-28 LAB
25(OH)D3 SERPL-MCNC: 19.6 NG/ML (ref 30–100)
ALBUMIN SERPL BCG-MCNC: 3.8 GM/DL (ref 3.2–5.2)
ALT SERPL W P-5'-P-CCNC: 56 U/L (ref 12–78)
BILIRUB SERPL-MCNC: 0.5 MG/DL (ref 0.2–1)
BUN SERPL-MCNC: 16 MG/DL (ref 7–18)
CALCIUM SERPL-MCNC: 9.7 MG/DL (ref 8.5–10.1)
CHLORIDE SERPL-SCNC: 106 MEQ/L (ref 98–107)
CHOLEST SERPL-MCNC: 173 MG/DL (ref ?–200)
CHOLEST/HDLC SERPL: 3.68 {RATIO} (ref ?–5)
CO2 SERPL-SCNC: 30 MEQ/L (ref 21–32)
CREAT SERPL-MCNC: 1.03 MG/DL (ref 0.7–1.3)
EST. AVERAGE GLUCOSE BLD GHB EST-MCNC: 114 MG/DL (ref 60–110)
GFR SERPL CREATININE-BSD FRML MDRD: > 60 ML/MIN/{1.73_M2} (ref 60–?)
GLUCOSE SERPL-MCNC: 100 MG/DL (ref 70–100)
HCT VFR BLD AUTO: 48.4 % (ref 42–52)
HDLC SERPL-MCNC: 47 MG/DL (ref 40–?)
HGB BLD-MCNC: 16.3 G/DL (ref 13.5–17.5)
LDLC SERPL CALC-MCNC: 102 MG/DL (ref ?–100)
MCH RBC QN AUTO: 28.8 PG (ref 27–33)
MCHC RBC AUTO-ENTMCNC: 33.7 G/DL (ref 32–36.5)
MCV RBC AUTO: 85.5 FL (ref 80–96)
NONHDLC SERPL-MCNC: 126 MG/DL
PLATELET # BLD AUTO: 279 10^3/UL (ref 150–450)
POTASSIUM SERPL-SCNC: 3.5 MEQ/L (ref 3.5–5.1)
PROT SERPL-MCNC: 7.6 GM/DL (ref 6.4–8.2)
PSA SERPL-MCNC: 1.44 NG/ML (ref ?–4)
RBC # BLD AUTO: 5.66 10^6/UL (ref 4.3–6.1)
SODIUM SERPL-SCNC: 141 MEQ/L (ref 136–145)
TESTOST SERPL-MCNC: 572 NG/DL (ref 241–827)
TRIGL SERPL-MCNC: 122 MG/DL (ref ?–150)
TSH SERPL DL<=0.005 MIU/L-ACNC: 1.28 UIU/ML (ref 0.36–3.74)
WBC # BLD AUTO: 6.4 10^3/UL (ref 4–10)

## 2022-09-07 ENCOUNTER — HOSPITAL ENCOUNTER (OUTPATIENT)
Dept: HOSPITAL 53 - M LAB | Age: 42
End: 2022-09-07
Attending: UROLOGY
Payer: COMMERCIAL

## 2022-09-07 DIAGNOSIS — Z85.47: Primary | ICD-10-CM

## 2022-09-07 LAB — LDH SERPL L TO P-CCNC: 164 U/L (ref 87–241)

## 2022-11-01 ENCOUNTER — HOSPITAL ENCOUNTER (OUTPATIENT)
Dept: HOSPITAL 53 - M LAB | Age: 42
End: 2022-11-01
Attending: FAMILY MEDICINE
Payer: COMMERCIAL

## 2022-11-01 DIAGNOSIS — I10: Primary | ICD-10-CM

## 2022-11-01 LAB
ALBUMIN SERPL BCG-MCNC: 3.9 GM/DL (ref 3.2–5.2)
ALT SERPL W P-5'-P-CCNC: 61 U/L (ref 12–78)
BILIRUB SERPL-MCNC: 0.5 MG/DL (ref 0.2–1)
BUN SERPL-MCNC: 19 MG/DL (ref 7–18)
CALCIUM SERPL-MCNC: 9.9 MG/DL (ref 8.5–10.1)
CHLORIDE SERPL-SCNC: 104 MEQ/L (ref 98–107)
CHOLEST SERPL-MCNC: 215 MG/DL (ref ?–200)
CHOLEST/HDLC SERPL: 5.12 {RATIO} (ref ?–5)
CO2 SERPL-SCNC: 29 MEQ/L (ref 21–32)
CREAT SERPL-MCNC: 1.08 MG/DL (ref 0.7–1.3)
EST. AVERAGE GLUCOSE BLD GHB EST-MCNC: 120 MG/DL (ref 60–110)
GFR SERPL CREATININE-BSD FRML MDRD: > 60 ML/MIN/{1.73_M2} (ref 60–?)
GLUCOSE SERPL-MCNC: 109 MG/DL (ref 70–100)
HCT VFR BLD AUTO: 46.7 % (ref 42–52)
HDLC SERPL-MCNC: 42 MG/DL (ref 40–?)
HGB BLD-MCNC: 15.9 G/DL (ref 13.5–17.5)
LDLC SERPL CALC-MCNC: 152 MG/DL (ref ?–100)
MCH RBC QN AUTO: 28.7 PG (ref 27–33)
MCHC RBC AUTO-ENTMCNC: 34 G/DL (ref 32–36.5)
MCV RBC AUTO: 84.3 FL (ref 80–96)
NONHDLC SERPL-MCNC: 173 MG/DL
PLATELET # BLD AUTO: 300 10^3/UL (ref 150–450)
POTASSIUM SERPL-SCNC: 3.2 MEQ/L (ref 3.5–5.1)
PROT SERPL-MCNC: 7.9 GM/DL (ref 6.4–8.2)
PSA SERPL-MCNC: 0.68 NG/ML (ref ?–4)
RBC # BLD AUTO: 5.54 10^6/UL (ref 4.3–6.1)
SODIUM SERPL-SCNC: 138 MEQ/L (ref 136–145)
TESTOST SERPL-MCNC: 483 NG/DL (ref 241–827)
TRIGL SERPL-MCNC: 104 MG/DL (ref ?–150)
TSH SERPL DL<=0.005 MIU/L-ACNC: 2.06 UIU/ML (ref 0.36–3.74)
WBC # BLD AUTO: 4.9 10^3/UL (ref 4–10)

## 2023-03-12 ENCOUNTER — HOSPITAL ENCOUNTER (OUTPATIENT)
Dept: HOSPITAL 53 - M LAB | Age: 43
End: 2023-03-12
Attending: UROLOGY
Payer: COMMERCIAL

## 2023-03-12 DIAGNOSIS — Z85.47: Primary | ICD-10-CM

## 2023-03-12 LAB — LDH SERPL L TO P-CCNC: 182 U/L (ref 120–246)

## 2023-04-13 ENCOUNTER — HOSPITAL ENCOUNTER (OUTPATIENT)
Dept: HOSPITAL 53 - M WUC | Age: 43
End: 2023-04-13
Attending: FAMILY MEDICINE
Payer: COMMERCIAL

## 2023-04-13 DIAGNOSIS — I10: Primary | ICD-10-CM

## 2023-04-13 DIAGNOSIS — E03.9: ICD-10-CM

## 2023-04-13 DIAGNOSIS — R53.83: ICD-10-CM

## 2023-04-13 LAB
25(OH)D3 SERPL-MCNC: 23.1 NG/ML (ref 20–100)
ALBUMIN SERPL BCG-MCNC: 3.9 G/DL (ref 3.2–5.2)
ALP SERPL-CCNC: 56 U/L (ref 46–116)
ALT SERPL W P-5'-P-CCNC: 47 U/L (ref 7–40)
AST SERPL-CCNC: 31 U/L (ref ?–34)
BILIRUB SERPL-MCNC: 0.6 MG/DL (ref 0.3–1.2)
BUN SERPL-MCNC: 17 MG/DL (ref 9–23)
CALCIUM SERPL-MCNC: 9.5 MG/DL (ref 8.5–10.1)
CHLORIDE SERPL-SCNC: 102 MMOL/L (ref 98–107)
CHOLEST SERPL-MCNC: 157 MG/DL (ref ?–200)
CHOLEST/HDLC SERPL: 3.65 {RATIO} (ref ?–5)
CO2 SERPL-SCNC: 30 MMOL/L (ref 20–31)
CREAT SERPL-MCNC: 1.06 MG/DL (ref 0.7–1.3)
EST. AVERAGE GLUCOSE BLD GHB EST-MCNC: 117 MG/DL (ref 60–110)
GFR SERPL CREATININE-BSD FRML MDRD: > 60 ML/MIN/{1.73_M2} (ref 60–?)
GLUCOSE SERPL-MCNC: 103 MG/DL (ref 60–100)
HCT VFR BLD AUTO: 46.4 % (ref 42–52)
HDLC SERPL-MCNC: 42.9 MG/DL (ref 40–?)
HGB BLD-MCNC: 15.5 G/DL (ref 13.5–17.5)
LDLC SERPL CALC-MCNC: 90.9 MG/DL (ref ?–100)
MCH RBC QN AUTO: 28.7 PG (ref 27–33)
MCHC RBC AUTO-ENTMCNC: 33.4 G/DL (ref 32–36.5)
MCV RBC AUTO: 85.9 FL (ref 80–96)
NONHDLC SERPL-MCNC: 114.1 MG/DL
PLATELET # BLD AUTO: 291 10^3/UL (ref 150–450)
POTASSIUM SERPL-SCNC: 3.3 MMOL/L (ref 3.5–5.1)
PROT SERPL-MCNC: 7.3 G/DL (ref 5.7–8.2)
PSA SERPL-MCNC: 0.53 NG/ML (ref ?–4)
RBC # BLD AUTO: 5.4 10^6/UL (ref 4.3–6.1)
SODIUM SERPL-SCNC: 140 MMOL/L (ref 136–145)
TESTOST SERPL-MCNC: 367 NG/DL (ref 241–827)
TRIGL SERPL-MCNC: 116 MG/DL (ref ?–150)
TSH SERPL DL<=0.005 MIU/L-ACNC: 1.19 UIU/ML (ref 0.55–4.78)
WBC # BLD AUTO: 5.6 10^3/UL (ref 4–10)

## 2023-12-07 ENCOUNTER — HOSPITAL ENCOUNTER (OUTPATIENT)
Dept: HOSPITAL 53 - M LABWUC | Age: 43
End: 2023-12-07
Attending: FAMILY MEDICINE
Payer: COMMERCIAL

## 2023-12-07 DIAGNOSIS — I10: Primary | ICD-10-CM

## 2023-12-07 DIAGNOSIS — R53.83: ICD-10-CM

## 2023-12-07 DIAGNOSIS — E78.5: ICD-10-CM

## 2023-12-07 LAB
ALBUMIN SERPL BCG-MCNC: 3.8 G/DL (ref 3.2–5.2)
ALP SERPL-CCNC: 56 U/L (ref 46–116)
ALT SERPL W P-5'-P-CCNC: 41 U/L (ref 7–40)
AST SERPL-CCNC: 21 U/L (ref ?–34)
BILIRUB SERPL-MCNC: 0.3 MG/DL (ref 0.3–1.2)
BUN SERPL-MCNC: 13 MG/DL (ref 9–23)
CALCIUM SERPL-MCNC: 10.1 MG/DL (ref 8.5–10.1)
CHLORIDE SERPL-SCNC: 105 MMOL/L (ref 98–107)
CHOLEST SERPL-MCNC: 188 MG/DL (ref ?–200)
CHOLEST/HDLC SERPL: 4.15 {RATIO} (ref ?–5)
CO2 SERPL-SCNC: 30 MMOL/L (ref 20–31)
CREAT SERPL-MCNC: 0.89 MG/DL (ref 0.7–1.3)
EST. AVERAGE GLUCOSE BLD GHB EST-MCNC: 114 MG/DL (ref 60–110)
GFR SERPL CREATININE-BSD FRML MDRD: > 60 ML/MIN/{1.73_M2} (ref 60–?)
GLUCOSE SERPL-MCNC: 92 MG/DL (ref 60–100)
HCT VFR BLD AUTO: 48.5 % (ref 42–52)
HDLC SERPL-MCNC: 45.2 MG/DL (ref 40–?)
HGB BLD-MCNC: 15.8 G/DL (ref 13.5–17.5)
LDLC SERPL CALC-MCNC: 119.4 MG/DL (ref ?–100)
MCH RBC QN AUTO: 28.5 PG (ref 27–33)
MCHC RBC AUTO-ENTMCNC: 32.6 G/DL (ref 32–36.5)
MCV RBC AUTO: 87.4 FL (ref 80–96)
NONHDLC SERPL-MCNC: 142.8 MG/DL
PLATELET # BLD AUTO: 360 10^3/UL (ref 150–450)
POTASSIUM SERPL-SCNC: 3.9 MMOL/L (ref 3.5–5.1)
PROT SERPL-MCNC: 7.4 G/DL (ref 5.7–8.2)
PSA SERPL-MCNC: 0.69 NG/ML (ref ?–4)
RBC # BLD AUTO: 5.55 10^6/UL (ref 4.3–6.1)
SODIUM SERPL-SCNC: 141 MMOL/L (ref 136–145)
TESTOST SERPL-MCNC: 476 NG/DL (ref 241–827)
TRIGL SERPL-MCNC: 117 MG/DL (ref ?–150)
TSH SERPL DL<=0.005 MIU/L-ACNC: 2.56 UIU/ML (ref 0.55–4.78)
WBC # BLD AUTO: 5.9 10^3/UL (ref 4–10)

## 2024-03-10 ENCOUNTER — HOSPITAL ENCOUNTER (OUTPATIENT)
Dept: HOSPITAL 53 - M LAB | Age: 44
End: 2024-03-10
Attending: UROLOGY
Payer: COMMERCIAL

## 2024-03-10 DIAGNOSIS — Z85.47: Primary | ICD-10-CM

## 2024-03-10 LAB — LDH SERPL L TO P-CCNC: 140 U/L (ref 120–246)

## 2024-08-18 ENCOUNTER — HOSPITAL ENCOUNTER (OUTPATIENT)
Dept: HOSPITAL 53 - M LAB | Age: 44
End: 2024-08-18
Attending: UROLOGY
Payer: COMMERCIAL

## 2024-08-18 ENCOUNTER — HOSPITAL ENCOUNTER (OUTPATIENT)
Dept: HOSPITAL 53 - M LAB | Age: 44
End: 2024-08-18
Attending: FAMILY MEDICINE
Payer: COMMERCIAL

## 2024-08-18 DIAGNOSIS — I10: Primary | ICD-10-CM

## 2024-08-18 DIAGNOSIS — Z85.47: Primary | ICD-10-CM

## 2024-08-18 DIAGNOSIS — R53.83: ICD-10-CM

## 2024-08-18 DIAGNOSIS — E03.9: ICD-10-CM

## 2024-08-18 LAB
ALBUMIN SERPL BCG-MCNC: 3.9 G/DL (ref 3.2–5.2)
ALP SERPL-CCNC: 59 U/L (ref 46–116)
ALT SERPL W P-5'-P-CCNC: 43 U/L (ref 7–40)
AST SERPL-CCNC: 23 U/L (ref ?–34)
BILIRUB SERPL-MCNC: 0.6 MG/DL (ref 0.3–1.2)
BUN SERPL-MCNC: 17 MG/DL (ref 9–23)
CALCIUM SERPL-MCNC: 9.7 MG/DL (ref 8.5–10.1)
CHLORIDE SERPL-SCNC: 106 MMOL/L (ref 98–107)
CHOLEST SERPL-MCNC: 196 MG/DL (ref ?–200)
CHOLEST/HDLC SERPL: 4.82 {RATIO} (ref ?–5)
CO2 SERPL-SCNC: 28 MMOL/L (ref 20–31)
CREAT SERPL-MCNC: 1 MG/DL (ref 0.7–1.3)
EST. AVERAGE GLUCOSE BLD GHB EST-MCNC: 117 MG/DL (ref 60–110)
GFR SERPL CREATININE-BSD FRML MDRD: > 60 ML/MIN/{1.73_M2} (ref 60–?)
GLUCOSE SERPL-MCNC: 104 MG/DL (ref 60–100)
HCT VFR BLD AUTO: 48.8 % (ref 42–52)
HDLC SERPL-MCNC: 40.6 MG/DL (ref 40–?)
HGB BLD-MCNC: 16.2 G/DL (ref 13.5–17.5)
LDH SERPL L TO P-CCNC: 142 U/L (ref 120–246)
LDLC SERPL CALC-MCNC: 133.4 MG/DL (ref ?–100)
MCH RBC QN AUTO: 28.4 PG (ref 27–33)
MCHC RBC AUTO-ENTMCNC: 33.2 G/DL (ref 32–36.5)
MCV RBC AUTO: 85.6 FL (ref 80–96)
NONHDLC SERPL-MCNC: 155.4 MG/DL
PLATELET # BLD AUTO: 294 10^3/UL (ref 150–450)
POTASSIUM SERPL-SCNC: 3.4 MMOL/L (ref 3.5–5.1)
PROT SERPL-MCNC: 7.6 G/DL (ref 5.7–8.2)
PSA SERPL-MCNC: 0.36 NG/ML (ref ?–4)
RBC # BLD AUTO: 5.7 10^6/UL (ref 4.3–6.1)
SODIUM SERPL-SCNC: 139 MMOL/L (ref 136–145)
TESTOST SERPL-MCNC: 502 NG/DL (ref 241–827)
TRIGL SERPL-MCNC: 110 MG/DL (ref ?–150)
TSH SERPL DL<=0.005 MIU/L-ACNC: 1.69 UIU/ML (ref 0.55–4.78)
WBC # BLD AUTO: 4.9 10^3/UL (ref 4–10)

## 2024-12-07 ENCOUNTER — HOSPITAL ENCOUNTER (OUTPATIENT)
Dept: HOSPITAL 53 - M LAB | Age: 44
End: 2024-12-07
Attending: FAMILY MEDICINE
Payer: COMMERCIAL

## 2024-12-07 DIAGNOSIS — D64.9: Primary | ICD-10-CM

## 2024-12-07 LAB
ALBUMIN SERPL BCG-MCNC: 3.5 G/DL (ref 3.2–5.2)
ALP SERPL-CCNC: 56 U/L (ref 40–129)
ALT SERPL W P-5'-P-CCNC: 40 U/L (ref 7–40)
AST SERPL-CCNC: 21 U/L (ref ?–34)
BILIRUB SERPL-MCNC: 0.5 MG/DL (ref 0.3–1.2)
BUN SERPL-MCNC: 16 MG/DL (ref 9–23)
CALCIUM SERPL-MCNC: 10.3 MG/DL (ref 8.5–10.1)
CHLORIDE SERPL-SCNC: 105 MMOL/L (ref 98–107)
CHOLEST SERPL-MCNC: 194 MG/DL (ref ?–200)
CHOLEST/HDLC SERPL: 5.01 {RATIO} (ref ?–5)
CO2 SERPL-SCNC: 30 MMOL/L (ref 20–31)
CREAT SERPL-MCNC: 0.94 MG/DL (ref 0.7–1.3)
EST. AVERAGE GLUCOSE BLD GHB EST-MCNC: 117 MG/DL (ref 60–110)
GFR SERPL CREATININE-BSD FRML MDRD: > 60 ML/MIN/{1.73_M2} (ref 60–?)
GLUCOSE SERPL-MCNC: 105 MG/DL (ref 60–100)
HCT VFR BLD AUTO: 47.5 % (ref 42–52)
HDLC SERPL-MCNC: 38.7 MG/DL (ref 40–?)
HGB BLD-MCNC: 15.8 G/DL (ref 13.5–17.5)
LDLC SERPL CALC-MCNC: 133.3 MG/DL (ref ?–100)
MCH RBC QN AUTO: 28.1 PG (ref 27–33)
MCHC RBC AUTO-ENTMCNC: 33.3 G/DL (ref 32–36.5)
MCV RBC AUTO: 84.4 FL (ref 80–96)
NONHDLC SERPL-MCNC: 155.3 MG/DL
PLATELET # BLD AUTO: 291 10^3/UL (ref 150–450)
POTASSIUM SERPL-SCNC: 3.6 MMOL/L (ref 3.5–5.1)
PROT SERPL-MCNC: 7.3 G/DL (ref 5.7–8.2)
PSA SERPL-MCNC: 0.45 NG/ML (ref ?–4)
RBC # BLD AUTO: 5.63 10^6/UL (ref 4.3–6.1)
SODIUM SERPL-SCNC: 141 MMOL/L (ref 136–145)
TESTOST SERPL-MCNC: 601 NG/DL (ref 241–827)
TRIGL SERPL-MCNC: 110 MG/DL (ref ?–150)
TSH SERPL DL<=0.005 MIU/L-ACNC: 1.53 UIU/ML (ref 0.55–4.78)
WBC # BLD AUTO: 4.8 10^3/UL (ref 4–10)

## 2025-03-12 ENCOUNTER — HOSPITAL ENCOUNTER (OUTPATIENT)
Dept: HOSPITAL 53 - M LAB | Age: 45
End: 2025-03-12
Attending: UROLOGY
Payer: COMMERCIAL

## 2025-03-12 DIAGNOSIS — Z85.47: Primary | ICD-10-CM

## 2025-03-12 LAB — LDH SERPL L TO P-CCNC: 173 U/L (ref 120–246)
